# Patient Record
Sex: MALE | Race: WHITE
[De-identification: names, ages, dates, MRNs, and addresses within clinical notes are randomized per-mention and may not be internally consistent; named-entity substitution may affect disease eponyms.]

---

## 2017-02-20 ENCOUNTER — HOSPITAL ENCOUNTER (OUTPATIENT)
Dept: HOSPITAL 75 - PREOP | Age: 62
End: 2017-02-20
Attending: OTOLARYNGOLOGY
Payer: COMMERCIAL

## 2017-02-20 VITALS — HEIGHT: 73 IN | WEIGHT: 235 LBS | BODY MASS INDEX: 31.14 KG/M2

## 2017-02-20 DIAGNOSIS — Z01.818: Primary | ICD-10-CM

## 2017-02-20 DIAGNOSIS — L98.9: ICD-10-CM

## 2017-02-23 ENCOUNTER — HOSPITAL ENCOUNTER (OUTPATIENT)
Dept: HOSPITAL 75 - SDC | Age: 62
Discharge: HOME | End: 2017-02-23
Attending: OTOLARYNGOLOGY
Payer: COMMERCIAL

## 2017-02-23 VITALS — DIASTOLIC BLOOD PRESSURE: 64 MMHG | SYSTOLIC BLOOD PRESSURE: 135 MMHG

## 2017-02-23 VITALS — HEIGHT: 73 IN | BODY MASS INDEX: 31.14 KG/M2 | WEIGHT: 235 LBS

## 2017-02-23 VITALS — SYSTOLIC BLOOD PRESSURE: 138 MMHG | DIASTOLIC BLOOD PRESSURE: 77 MMHG

## 2017-02-23 VITALS — DIASTOLIC BLOOD PRESSURE: 89 MMHG | SYSTOLIC BLOOD PRESSURE: 134 MMHG

## 2017-02-23 DIAGNOSIS — E11.9: ICD-10-CM

## 2017-02-23 DIAGNOSIS — C44.219: Primary | ICD-10-CM

## 2017-02-23 DIAGNOSIS — Z79.84: ICD-10-CM

## 2017-02-23 PROCEDURE — 82962 GLUCOSE BLOOD TEST: CPT

## 2017-02-23 PROCEDURE — 88305 TISSUE EXAM BY PATHOLOGIST: CPT

## 2017-02-23 PROCEDURE — 88332 PATH CONSLTJ SURG EA ADD BLK: CPT

## 2017-02-23 PROCEDURE — 87081 CULTURE SCREEN ONLY: CPT

## 2017-02-23 PROCEDURE — 88331 PATH CONSLTJ SURG 1 BLK 1SPC: CPT

## 2017-02-23 RX ADMIN — SODIUM CHLORIDE, SODIUM LACTATE, POTASSIUM CHLORIDE, AND CALCIUM CHLORIDE PRN MLS/HR: 600; 310; 30; 20 INJECTION, SOLUTION INTRAVENOUS at 09:15

## 2017-02-23 RX ADMIN — SODIUM CHLORIDE, SODIUM LACTATE, POTASSIUM CHLORIDE, AND CALCIUM CHLORIDE PRN MLS/HR: 600; 310; 30; 20 INJECTION, SOLUTION INTRAVENOUS at 07:21

## 2017-02-23 NOTE — PROGRESS NOTE-POST OPERATIVE
Post-Operative Progess Note


Pre-Operative Diagnosis


Left Ear Lesion





Post-Operative Diagnosis





same





Post-Op Procedure Note


Date of Procedure:  Feb 23, 2017


Name of Procedure:  


Excsion of Left EAr Silvia suero Multiple Frozen sectiosn and


REconstruction with FTSg-Donbor Site Left neck


Anesthesia Type


get


Estimated blood loss (mL):  minimal


Specimen(s) collected


left ear lesion








MATHEW MATHEW MD Feb 23, 2017 10:38 am

## 2017-02-23 NOTE — XMS REPORT
Continuity of Care Document

 Created on: 2017



SANTI HEAD

External Reference #: [id number]

: 1955

Sex: Male



Demographics







 Address  [address] [place]  [address]

 

 Home Phone  [phone]

 

 Preferred Language  Unknown

 

 Marital Status  Unknown

 

 Yazidi Affiliation  Unknown

 

 Race  Unknown

 

 Ethnic Group  Unknown





Author







 Author  [facility]

 

 Organization  [facility]

 

 Address  Unknown

 

 Phone  Unavailable







Support







 Name  Relationship  Address  Phone

 

 MATHEW MATHEW MD  Caregiver  [address], [address]

[place]  [address] [phone]







Insurance Providers







 Payer Name  Policy Number  Subscriber Name  Relationship

 

 [facility]  [id number]  Santi Head  18 Self / Same As Patient







Advance Directives







 Directive  Response  Recorded Date/Time

 

 Advance Directives  Yes  17 12:25pm

 

 Health Care Power of   Y BILL  17 12:25pm

 

 Organ Donor  No  17 12:25pm

 

 Resuscitation Status  Full Code  17 12:25pm







Problems

No problem information available.



Medications

Current Home Medications







 Medication  Dose  Units  Route  Directions  Days/Qty  Instructions  Start Date

 

 Metformin Hcl 1,000 Mg  1,000  Mg  Oral  Twice A Day        17

 

 Lisinopril 20 Mg  20  Mg  Oral  Daily        17

 

 Alendronate Sodium 70 Mg  70  Mg  Oral  Weekly     TAKES ON 17

 

 Atorvastatin Calcium 10 Mg  10  Mg  Oral  M,W,F,        17

 

 Canagliflozin 300 Mg  300  Mg  Oral  Daily        17







Social History







 Social History Problem  Response  Recorded Date/Time

 

 Alcohol Use  Rarely Uses  2017 12:25pm

 

 Recreational Drug Use  No  2017 12:25pm

 

 Recent Foreign Travel  No  2017 12:25pm

 

 Recent Infectious Disease Exposure  No  2017 12:25pm

 

 Smoking Status  Never a Smoker  2017 12:25pm

 

 Recent Hopitalizations  No  2017 12:25pm









 Query  Response  Start Date  Stop Date

 

 Smoking Status  Never a Smoker      







Hospital Discharge Instructions

No hospital discharge instructions.



Plan of Care







 Discharge Date  17 12:42pm

 

 Prescriptions  See Medication Section







Functional Status

No functional status results.



Allergies, Adverse Reactions, Alerts

No known allergies.



Immunizations

No immunization records.



Vital Signs

Acute Vital Signs





 Vital  Response  Date/Time

 

 Height (Feet)  6 feet  2017 12:24pm

 

 Height (Inches)  1.00 inches  2017 12:24pm

 

 Height (Calculated Centimeters)  185.743986 cm  2017 12:24pm

 

 Weight (Pounds)  235 pounds  2017 12:24pm

 

 Weight (Ounces)  0.0 oz  2017 12:24pm

 

 Weight (Calculated Grams)  512113.21 gm  2017 12:24pm

 

 Weight (Calculated Kilograms)  106.181701 kilograms  2017 12:24pm

 

 Calculated BMI  31.0  2017 12:24pm







Results

No known relevant diagnostic tests, laboratory data and/or discharge summary.



Procedures

No known history of procedures.



Encounters







 Encounter  Location  Arrival/Admit Date  Discharge/Depart Date  Attending 
Provider

 

 Registered Clinic  [facility]  17 5:43am     MATHEW MATHEW MD

## 2017-02-23 NOTE — PROGRESS NOTE-PRE OPERATIVE
Pre-Operative Progress Note


H&P Reviewed


The H&P was reviewed, patient examined and no changes noted.


Date H&P Reviewed:  Feb 23, 2017


Time H&P Reviewed:  08:00


Pre-Operative Diagnosis:  Left Ear Lesion








MATHEW MATHEW MD Feb 23, 2017 8:36 am

## 2017-02-23 NOTE — XMS REPORT
Continuity of Care Document

 Created on: 2017



SANTI HEAD

External Reference #: [id number]

: 1955

Sex: Male



Demographics







 Address  [address] [place]  [address]

 

 Home Phone  [phone]

 

 Preferred Language  Unknown

 

 Marital Status  Unknown

 

 Amish Affiliation  Unknown

 

 Race  Unknown

 

 Ethnic Group  Unknown





Author







 Author  [facility]

 

 Organization  [facility]

 

 Address  Unknown

 

 Phone  Unavailable







Support







 Name  Relationship  Address  Phone

 

 MATHEW MATHEW MD  Caregiver  [address], [address]

[place]  [address] [phone]







Insurance Providers







 Payer Name  Policy Number  Subscriber Name  Relationship

 

 [facility]  [id number]  Santi Head  18 Self / Same As Patient







Advance Directives







 Directive  Response  Recorded Date/Time

 

 Advance Directives  Yes  17 12:25pm

 

 Health Care Power of   Y BILL  17 12:25pm

 

 Organ Donor  No  17 12:25pm

 

 Resuscitation Status  Full Code  17 12:25pm







Problems

No problem information available.



Medications

Current Home Medications







 Medication  Dose  Units  Route  Directions  Days/Qty  Instructions  Start Date

 

 Metformin Hcl 1,000 Mg  1,000  Mg  Oral  Twice A Day        17

 

 Lisinopril 20 Mg  20  Mg  Oral  Daily        17

 

 Alendronate Sodium 70 Mg  70  Mg  Oral  Weekly     TAKES ON 17

 

 Atorvastatin Calcium 10 Mg  10  Mg  Oral  M,W,F,        17

 

 Canagliflozin 300 Mg  300  Mg  Oral  Daily        17







Social History







 Social History Problem  Response  Recorded Date/Time

 

 Alcohol Use  Rarely Uses  2017 12:25pm

 

 Recreational Drug Use  No  2017 12:25pm

 

 Recent Foreign Travel  No  2017 12:25pm

 

 Recent Infectious Disease Exposure  No  2017 12:25pm

 

 Smoking Status  Never a Smoker  2017 12:25pm

 

 Recent Hopitalizations  No  2017 12:25pm









 Query  Response  Start Date  Stop Date

 

 Smoking Status  Never a Smoker      







Hospital Discharge Instructions

No hospital discharge instructions.



Plan of Care







 Discharge Date  17 12:42pm

 

 Prescriptions  See Medication Section







Functional Status

No functional status results.



Allergies, Adverse Reactions, Alerts

No known allergies.



Immunizations

No immunization records.



Vital Signs

Acute Vital Signs





 Vital  Response  Date/Time

 

 Height (Feet)  6 feet  2017 12:24pm

 

 Height (Inches)  1.00 inches  2017 12:24pm

 

 Height (Calculated Centimeters)  185.804503 cm  2017 12:24pm

 

 Weight (Pounds)  235 pounds  2017 12:24pm

 

 Weight (Ounces)  0.0 oz  2017 12:24pm

 

 Weight (Calculated Grams)  612411.21 gm  2017 12:24pm

 

 Weight (Calculated Kilograms)  106.902808 kilograms  2017 12:24pm

 

 Calculated BMI  31.0  2017 12:24pm







Results

No known relevant diagnostic tests, laboratory data and/or discharge summary.



Procedures

No known history of procedures.



Encounters







 Encounter  Location  Arrival/Admit Date  Discharge/Depart Date  Attending 
Provider

 

 Registered Clinic  [facility]  17 5:43am     MATHEW MATHEW MD

## 2018-12-27 ENCOUNTER — HOSPITAL ENCOUNTER (OUTPATIENT)
Dept: HOSPITAL 75 - PREOP | Age: 63
Discharge: HOME | End: 2018-12-27
Attending: OTOLARYNGOLOGY
Payer: COMMERCIAL

## 2018-12-27 VITALS — WEIGHT: 235 LBS | BODY MASS INDEX: 31.14 KG/M2 | HEIGHT: 73 IN

## 2018-12-27 DIAGNOSIS — Z01.818: Primary | ICD-10-CM
